# Patient Record
Sex: FEMALE | Race: WHITE | NOT HISPANIC OR LATINO | Employment: FULL TIME | URBAN - METROPOLITAN AREA
[De-identification: names, ages, dates, MRNs, and addresses within clinical notes are randomized per-mention and may not be internally consistent; named-entity substitution may affect disease eponyms.]

---

## 2020-09-16 ENCOUNTER — OFFICE VISIT (OUTPATIENT)
Dept: OPTOMETRY | Facility: CLINIC | Age: 35
End: 2020-09-16
Payer: COMMERCIAL

## 2020-09-16 DIAGNOSIS — H18.823 CONTACT LENS OVERWEAR OF BOTH EYES: ICD-10-CM

## 2020-09-16 DIAGNOSIS — H04.123 DRY EYES: ICD-10-CM

## 2020-09-16 DIAGNOSIS — H16.201 KERATOCONJUNCTIVITIS OF RIGHT EYE: Primary | ICD-10-CM

## 2020-09-16 PROCEDURE — 99203 OFFICE O/P NEW LOW 30 MIN: CPT | Performed by: OPTOMETRIST

## 2020-09-16 RX ORDER — NICOTINE POLACRILEX 2 MG
GUM BUCCAL
COMMUNITY
End: 2022-11-09

## 2020-09-16 RX ORDER — LACTOBACILLUS RHAMNOSUS GG 10B CELL
1 CAPSULE ORAL 2 TIMES DAILY
COMMUNITY

## 2020-09-16 RX ORDER — TOBRAMYCIN AND DEXAMETHASONE 3; 1 MG/ML; MG/ML
1 SUSPENSION/ DROPS OPHTHALMIC 4 TIMES DAILY
Qty: 1 BOTTLE | Refills: 0 | Status: SHIPPED | OUTPATIENT
Start: 2020-09-16 | End: 2020-09-23

## 2020-09-16 RX ORDER — LOPERAMIDE HCL 2 MG
2 CAPSULE ORAL
COMMUNITY
Start: 2020-08-13

## 2020-09-16 RX ORDER — CITALOPRAM HYDROBROMIDE 20 MG/1
TABLET ORAL
COMMUNITY
Start: 2020-06-16 | End: 2023-06-26

## 2020-09-16 RX ORDER — BUPROPION HYDROCHLORIDE 150 MG/1
150 TABLET ORAL
COMMUNITY
Start: 2019-11-21

## 2020-09-16 RX ORDER — LORATADINE 10 MG/1
10 TABLET ORAL
COMMUNITY
Start: 2018-11-05

## 2020-09-16 RX ORDER — CALCIUM POLYCARBOPHIL 625 MG
TABLET ORAL
COMMUNITY

## 2020-09-16 RX ORDER — ELECTROLYTES/DEXTROSE
1 SOLUTION, ORAL ORAL
COMMUNITY
Start: 2018-11-05

## 2020-09-16 ASSESSMENT — SLIT LAMP EXAM - LIDS
COMMENTS: NORMAL
COMMENTS: THIN STRIP

## 2020-09-16 ASSESSMENT — EXTERNAL EXAM - LEFT EYE: OS_EXAM: NORMAL

## 2020-09-16 ASSESSMENT — VISUAL ACUITY
OD_CC: 20/20
CORRECTION_TYPE: GLASSES
METHOD: SNELLEN - LINEAR

## 2020-09-16 ASSESSMENT — EXTERNAL EXAM - RIGHT EYE: OD_EXAM: NORMAL

## 2020-09-16 NOTE — PROGRESS NOTES
Chief Complaint   Patient presents with     Eye Problem Right Eye       Do you wear contact lenses? Yes, has not worn since last night.  HPI    Eye Problem Right Eye      In right eye. Onset was sudden. This started 1 day ago. Charactertized as blurred vision. Severity is moderate. Occurring constantly. It is worse throughout the day. Since onset it is stable. Associated symptoms include redness, eye pain, headache, pain with eye movement, and tearing. Treatments tried include warm compresses. Response to treatment was no improvement. Pain was noted as 4/10.   Comments      Looks like there's a raised bump on the sclera right eye temporally.  Was wearing cls all day yesterday,. When she took them out it felt a little better.  Woke up with more pain this morning.              Gabriela Fowler CPO    See Review Of Systems   Wears Finisar for 3-4 months at a time no extended wear       Medical, surgical and family histories reviewed and updated 9/16/2020.         OBJECTIVE: See Ophthalmology exam    ASSESSMENT:    ICD-10-CM    1. Keratoconjunctivitis of right eye  H16.201 tobramycin-dexamethasone (TOBRADEX) 0.3-0.1 % ophthalmic suspension   2. Dry eyes  H04.123    3. Contact lens overwear of both eyes  H18.823       PLAN:  tobradex four times daily right eye for one week  Artificial tears as needed   Refit in more breathable lens (currently in Proclear)    Clara Eaton OD

## 2020-09-16 NOTE — LETTER
9/16/2020         RE: Mallory Rodriguez  [address] [facility] [facility] [address]        Dear Colleague,    [facility] Please see a copy of my visit note below.    Chief Complaint   Patient presents with     Eye Problem Right Eye       Do you wear contact lenses? Yes, has not worn since last night.  HPI    Eye Problem Right Eye      In right eye. Onset was sudden. This started 1 day ago. Charactertized as blurred vision. Severity is moderate. Occurring constantly. It is worse throughout the day. Since onset it is stable. Associated symptoms include redness, eye pain, headache, pain with eye movement, and tearing. Treatments tried include warm compresses. Response to treatment was no improvement. Pain was noted as 4/10.   Comments      Looks like there's a raised bump on the sclera right eye temporally.  Was wearing cls all day yesterday,. When she took them out it felt a little better.  Woke up with more pain this morning.              Gabriela Fowler CPO    See Review Of Systems   Wears proclear for 3-4 months at a time no extended wear       Medical, surgical and family histories reviewed and updated 9/16/2020.         OBJECTIVE: See Ophthalmology exam    ASSESSMENT:    ICD-10-CM    1. Keratoconjunctivitis of right eye  H16.201 tobramycin-dexamethasone (TOBRADEX) 0.3-0.1 % ophthalmic suspension   2. Dry eyes  H04.123    3. Contact lens overwear of both eyes  H18.823       PLAN:  tobradex four times daily right eye for one week  Artificial tears as needed   Refit in more breathable lens (currently in Proclear)    Clara Eaton OD     Again, thank you for allowing me to participate in the care of your patient.        Sincerely,        Clara Eaton, OD

## 2020-10-08 ENCOUNTER — OFFICE VISIT (OUTPATIENT)
Dept: OPTOMETRY | Facility: CLINIC | Age: 35
End: 2020-10-08
Payer: COMMERCIAL

## 2020-10-08 DIAGNOSIS — H52.13 MYOPIA OF BOTH EYES: Primary | ICD-10-CM

## 2020-10-08 PROCEDURE — 92310 CONTACT LENS FITTING OU: CPT | Mod: GA | Performed by: OPTOMETRIST

## 2020-10-08 PROCEDURE — 92015 DETERMINE REFRACTIVE STATE: CPT | Performed by: OPTOMETRIST

## 2020-10-08 PROCEDURE — 92014 COMPRE OPH EXAM EST PT 1/>: CPT | Performed by: OPTOMETRIST

## 2020-10-08 ASSESSMENT — VISUAL ACUITY
OS_CC: 20/20
METHOD: SNELLEN - LINEAR
OS_SC: 20/100
OD_CC+: -1
OD_SC+: -1
OD_SC: 20/100
OS_CC: 20/20
CORRECTION_TYPE: GLASSES
OD_CC: 20/20
OD_CC: 20/20

## 2020-10-08 ASSESSMENT — REFRACTION_MANIFEST
OD_AXIS: 070
OD_CYLINDER: +0.50
OS_AXIS: 122
OD_SPHERE: -2.00
OS_CYLINDER: +0.50
OS_SPHERE: -2.00
METHOD_AUTOREFRACTION: 1
OS_SPHERE: -2.50
OD_SPHERE: -1.75

## 2020-10-08 ASSESSMENT — KERATOMETRY
OS_K2POWER_DIOPTERS: 43.37
OS_AXISANGLE2_DEGREES: 169
OD_AXISANGLE_DEGREES: 69
OD_K1POWER_DIOPTERS: 42.75
OS_AXISANGLE_DEGREES: 79
OD_K2POWER_DIOPTERS: 43.37
OD_AXISANGLE2_DEGREES: 159
OS_K1POWER_DIOPTERS: 42.62
METHOD_AUTO_MANUAL: AUTOMATED

## 2020-10-08 ASSESSMENT — REFRACTION_CURRENTRX
OD_SPHERE: -1.75
OS_BRAND: ALCON AIR OPTIX PLUS HYDRAGLYDE BC 8.6, D 14.2
OS_SPHERE: -2.25
OD_BRAND: COOPER BIOFINITY BC 8.6, D 14.0

## 2020-10-08 ASSESSMENT — TONOMETRY
IOP_METHOD: APPLANATION
OD_IOP_MMHG: 15
OS_IOP_MMHG: 16

## 2020-10-08 ASSESSMENT — SLIT LAMP EXAM - LIDS
COMMENTS: NORMAL
COMMENTS: THIN STRIP

## 2020-10-08 ASSESSMENT — REFRACTION_WEARINGRX
OS_SPHERE: -2.00
OS_AXIS: 110
OD_SPHERE: -2.25
OD_AXIS: 067
OD_CYLINDER: +0.25
SPECS_TYPE: SVL
OS_CYLINDER: +0.50

## 2020-10-08 ASSESSMENT — CONF VISUAL FIELD
OS_NORMAL: 1
OD_NORMAL: 1
METHOD: COUNTING FINGERS

## 2020-10-08 ASSESSMENT — CUP TO DISC RATIO
OD_RATIO: 0.1
OS_RATIO: 0.1

## 2020-10-08 ASSESSMENT — EXTERNAL EXAM - LEFT EYE: OS_EXAM: NORMAL

## 2020-10-08 ASSESSMENT — EXTERNAL EXAM - RIGHT EYE: OD_EXAM: NORMAL

## 2020-10-08 NOTE — PROGRESS NOTES
"Chief Complaint   Patient presents with     Annual Eye Exam     Contact Lens Evaluation     Blur at distance  Has been wearing glasses for a few weeks since eye infection    AP: 2017  Spends a lot of time on the computer, wonders if this is related to her vision changes.     Previous contact lens wearer? Yes: Proclear  Comfort of contact lenses : They're \"fine\", doesn't have anything to compare them to.   Satisfied with current lenses: No        Last Eye Exam: 2017  Dilated Previously: Yes    What are you currently using to see?  glasses and contacts    Distance Vision Acuity: Noticed gradual change in both eyes    Near Vision Acuity: Satisfied with vision while reading and using computer with glasses and cls    Eye Comfort: good  Do you use eye drops? : No  Occupation or Hobbies: Admin work      Gabriela Fowler CPO     Medical, surgical and family histories reviewed and updated 10/8/2020.     Discussed refit and more frequent replacement   OBJECTIVE: See Ophthalmology exam    ASSESSMENT:    ICD-10-CM    1. Myopia of both eyes  H52.13 EYE EXAM (SIMPLE-NONBILLABLE)     REFRACTION     CONTACT LENS FITTING,BILAT w/ signed waiver     liked air optix over biofinity  for both eyes    PLAN:   Dispensed trials w/ prescription     Clara Eaton OD   "

## 2020-10-08 NOTE — PATIENT INSTRUCTIONS
You may use rewetting drops such as blink or refresh contacts over lenses   and artificial tears when lenses are out    There are over the counter drops that work well and may be used up to 4 x daily when lenses are out if your eyes are dry. ( systane , refresh, thera tears) If you need more than 4 drops daily, use a preservative free product which come in individual vials and may be used for 24 hours until finished and discarded.     If you are in not in dailies, consider clear care solution if still having problems and reduce wear time to 10-12 hours

## 2020-10-08 NOTE — LETTER
"    10/8/2020         RE: Vanessa Rodriguez  [address] [address]        Dear Colleague,    [facility] Please see a copy of my visit note below.    Chief Complaint   Patient presents with     Annual Eye Exam     Contact Lens Evaluation     Blur at distance  Has been wearing glasses for a few weeks since eye infection    AP: 2017  Spends a lot of time on the computer, wonders if this is related to her vision changes.     Previous contact lens wearer? Yes: Proclear  Comfort of contact lenses : They're \"fine\", doesn't have anything to compare them to.   Satisfied with current lenses: No        Last Eye Exam: 2017  Dilated Previously: Yes    What are you currently using to see?  glasses and contacts    Distance Vision Acuity: Noticed gradual change in both eyes    Near Vision Acuity: Satisfied with vision while reading and using computer with glasses and cls    Eye Comfort: good  Do you use eye drops? : No  Occupation or Hobbies: Admin work      Gabriela Fowler CPO     Medical, surgical and family histories reviewed and updated 10/8/2020.     Discussed refit and more frequent replacement   OBJECTIVE: See Ophthalmology exam    ASSESSMENT:    ICD-10-CM    1. Myopia of both eyes  H52.13 EYE EXAM (SIMPLE-NONBILLABLE)     REFRACTION     CONTACT LENS FITTING,BILAT w/ signed waiver     liked air optix over biofinity  for both eyes    PLAN:   Dispensed trials w/ prescription     Clara Eaton OD       Again, thank you for allowing me to participate in the care of your patient.        Sincerely,        Clara Eaton, OD    "

## 2022-09-24 ENCOUNTER — HEALTH MAINTENANCE LETTER (OUTPATIENT)
Age: 37
End: 2022-09-24

## 2022-10-26 NOTE — PROGRESS NOTES
[facility] Dermatology Note  Encounter Date: Nov 9, 2022  Office Visit     Dermatology Problem List:  1. Hair loss-androgenetic alopecia  -topical minoxidil 11/2022 recommended   2. Hirsutism  - Labs: testosterone, DHEAS, Prolactin, TSH - pending  3. Hyperhidrosis  - Drysol  ____________________________________________    Assessment & Plan:    # Androgenetic alopecia  --start minoxidil 5% foam or solution once daily. Keep away from face, counseled on risk of irritation and hair growth on face, risk of temporary shedfing. Keep product away from face. Stop if you become pregnant. Handout provided  - Recommended discontinuing biotin and obtaining labs one month after stopping. She is using an IUD.     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 Submitted letter to pool for coverage    # Dermatitis, left nasolabial fold   - Start OTC hydrocortisone-for 1-2 weeks, then stop      Procedures Performed:   None    Follow-up: 12 week(s) in-person, or earlier for new or changing lesions    Staff and Scribe:     Scribe Disclosure:  RAKESH PAINTING, am serving as a scribe to document services personally performed by Kemi Tristan MD based on data collection and the provider's statements to me.     Provider Disclosure:   The documentation recorded by the scribe accurately reflects the services I  personally performed and the decisions made by me.    Kemi Tristan MD    Department of Dermatology  [facility]: Phone: [phone], Fax:[phone]  [facility]: Phone: [phone], Fax: [phone]  ____________________________________________    CC: Derm Problem (Vanessa is here today to discuss underarm odor and would like to discuss laser hair removal for underarms, upper lip and possibly bikini line.)    HPI:  Ms. Vanessa Rodriguez is a(n) 37 year old female who presents today as a new patient for a LHR consultation.    Today, patient reports that she is interested in LHR on the upper lip, bikini line, underarms, black hair around areolas. She denies prior cosmetic procedures or medical problems in the underarms. She has had unwanted hair growth on the face which she shaves since high school. She has regular periods. She has an IUD. She has been taking Biotin because she has noticed diffuse hair loss. Her family is from [place] originally. She has a family history of facial hair (grandmother). There is also a family history of hair loss. Additionally, she reports an unpleasant armpit odor. She has been using OTC Dove clinical strength and Degree antiperspirant deoderants. She also tried Lume products without improvement.    Patient is otherwise feeling well, without additional skin concerns.    Labs Reviewed:  N/A    Physical Exam:  Vitals: There were no vitals taken for this visit.  SKIN: Focused examination of face, scalp, and axillae. was performed.  - Hair part width 1.5  - Thinning on crown  - Bitemporal thinning  - 1-2 cm hair growth on the frontal scalp.  - 4 cm hair growth along the part.  - Eyebrows and eyelashes normal.  - Fingernails normal.  - Faint tan macules on cheeks and forehead.  - Faint erythematous patch, left nasolabial fold.  - Terminal hair, bilateral  axillae.  - No other lesions of concern on areas examined.     Medications:  Current Outpatient Medications   Medication     Biotin 1 MG CAPS     buPROPion (WELLBUTRIN XL) 150 MG 24 hr tablet     Calcium Polycarbophil (FIBER) 625 MG tablet     citalopram (CELEXA) 20 MG tablet     lactobacillus rhamnosus, GG, (CULTURELL) capsule     loperamide (IMODIUM) 2 MG capsule     loratadine (CLARITIN) 10 MG tablet     Multiple Vitamins-Minerals (MULTIVITAMIN ADULT) TABS     No current facility-administered medications for this visit.      Past Medical History:   There is no problem list on file for this patient.    No past medical history on file.     CC Referred Self, MD  No address on file on close of this encounter.

## 2022-11-09 ENCOUNTER — OFFICE VISIT (OUTPATIENT)
Dept: DERMATOLOGY | Facility: CLINIC | Age: 37
End: 2022-11-09
Payer: COMMERCIAL

## 2022-11-09 DIAGNOSIS — L68.0 HIRSUTISM: ICD-10-CM

## 2022-11-09 DIAGNOSIS — L30.9 DERMATITIS: ICD-10-CM

## 2022-11-09 DIAGNOSIS — L74.510 HYPERHIDROSIS OF AXILLA: ICD-10-CM

## 2022-11-09 DIAGNOSIS — L65.9 LOSS OF HAIR: ICD-10-CM

## 2022-11-09 DIAGNOSIS — L74.510 PRIMARY FOCAL HYPERHIDROSIS, AXILLA: ICD-10-CM

## 2022-11-09 PROCEDURE — 99214 OFFICE O/P EST MOD 30 MIN: CPT | Performed by: DERMATOLOGY

## 2022-11-09 NOTE — PATIENT INSTRUCTIONS
Stop biotin FDA warning , FOR SCALP HAIR LOSS  Biotin FDA warning reviewed    Rogaine for androgenetic alopecia  Consider spironolactone to grow hair on the scalp and reduce hair growth on the face    Botox for armpits  Topical Rogaine (Minoxidil) for Pattern Hair Loss    Minoxidil is an FDA approved over the counter topical for the treatment of hair loss and thinning hair in men and women.   Initially a 2% solution was available however this required application twice daily. A 5% solution is also now approved, only requiring application once per day.     Available Products:   Rogaine 5% solution: Packaged for men however can be used by men or women. Use dropper and apply directly to scalp at bedtime. This product can cause an allergy because of presence of propylene glycol. Stop this product if you develop a rash or itching and contact your physician.   Rogaine 5% foam: Packaged for men and women: Apply foam directly to the scalp once daily. This is less greasy compared to the solution. This formula is preferred for those who had a reaction to the solution product. If you develop rash or itching, stop the product and contact your physician.     What if I stop minoxidil topical?   After stopping minoxidil the hair will return to the usual pattern of thinning. Using the product 3-4 times per week is better than not using product at all.       Can I use generic minoxidil?   Yes, look for 5% minoxidil.     What are the side effects?  The most common side effect is rash or itching of the scalp. This can occur if a contact allergy develops with propylene glycol. A small group of patients noticed the appearance of facial hair if the product runs onto the face or with prolonged use. Keep it away from the face.  This can cause temporary shedding. Please, call us if this happens.  This can irritated the scalp. Please, call us if this happens.  Stop this product if you become pregnant or are breastfeeding      Last updated:  11/2/2021       Laser hair removal     [facility]    [facility]    [facility]

## 2022-11-09 NOTE — LETTER
11/9/2022       RE: Vanessa Rodriguez  [address] [address]     Dear Colleague,    Thank you for referring your patient, Vanessa Rodriguez, to the [facility] [place] at [facility]. Please see a copy of my visit note below.    [facility] Dermatology Note  Encounter Date: Nov 9, 2022  Office Visit     Dermatology Problem List:  1. Hair loss-androgenetic alopecia  -topical minoxidil 11/2022 recommended   2. Hirsutism  - Labs: testosterone, DHEAS, Prolactin, TSH - pending  3. Hyperhidrosis  - Drysol  ____________________________________________    Assessment & Plan:    # Androgenetic alopecia  --start minoxidil 5% foam or solution once daily. Keep away from face, counseled on risk of irritation and hair growth on face, risk of temporary shedfing. Keep product away from face. Stop if you become pregnant. Handout provided  - Recommended discontinuing biotin and obtaining labs one month after stopping. She is using an IUD.     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 Submitted letter to pool for coverage    # Dermatitis, left nasolabial fold   - Start OTC hydrocortisone-for 1-2 weeks, then stop      Procedures Performed:   None    Follow-up: 12 week(s) in-person, or earlier for new or  changing lesions    Staff and Scribe:     Scribe Disclosure:  I, RAKESH BROWN, am serving as a scribe to document services personally performed by Kemi Tristan MD based on data collection and the provider's statements to me.     Provider Disclosure:   The documentation recorded by the scribe accurately reflects the services I personally performed and the decisions made by me.    Kemi Tristan MD    Department of Dermatology  [facility]: Phone: [phone], Fax:[phone]  [facility]: Phone: [phone], Fax: [phone]  ____________________________________________    CC: Derm Problem (Vanessa is here today to discuss underarm odor and would like to discuss laser hair removal for underarms, upper lip and possibly bikini line.)    HPI:  Ms. Vanessa Rodriguez is a(n) 37 year old female who presents today as a new patient for a LHR consultation.    Today, patient reports that she is interested in LHR on the upper lip, bikini line, underarms, black hair around areolas. She denies prior cosmetic procedures or medical problems in the underarms. She has had unwanted hair growth on the face which she shaves since high school. She has regular periods. She has an IUD. She has been taking Biotin because she has noticed diffuse hair loss. Her family is from [place] originally. She has a family history of facial hair (grandmother). There is also a family history of hair loss. Additionally, she reports an unpleasant armpit odor. She has been using OTC Dove clinical strength and Degree antiperspirant deoderants. She also tried Lume products without improvement.    Patient is otherwise feeling well, without additional skin concerns.    Labs Reviewed:  N/A    Physical Exam:  Vitals: There were no vitals taken for this visit.  SKIN: Focused examination of face, scalp, and axillae. was  performed.  - Hair part width 1.5  - Thinning on crown  - Bitemporal thinning  - 1-2 cm hair growth on the frontal scalp.  - 4 cm hair growth along the part.  - Eyebrows and eyelashes normal.  - Fingernails normal.  - Faint tan macules on cheeks and forehead.  - Faint erythematous patch, left nasolabial fold.  - Terminal hair, bilateral axillae.  - No other lesions of concern on areas examined.     Medications:  Current Outpatient Medications   Medication     Biotin 1 MG CAPS     buPROPion (WELLBUTRIN XL) 150 MG 24 hr tablet     Calcium Polycarbophil (FIBER) 625 MG tablet     citalopram (CELEXA) 20 MG tablet     lactobacillus rhamnosus, GG, (CULTURELL) capsule     loperamide (IMODIUM) 2 MG capsule     loratadine (CLARITIN) 10 MG tablet     Multiple Vitamins-Minerals (MULTIVITAMIN ADULT) TABS     No current facility-administered medications for this visit.      Past Medical History:   There is no problem list on file for this patient.    No past medical history on file.     CC Referred Self, MD  No address on file on close of this encounter.      Again, thank you for allowing me to participate in the care of your patient.      Sincerely,    Kemi Tristan MD

## 2022-11-09 NOTE — LETTER
Date:November 14, 2022      Patient was self referred, no letter generated. Do not send.        [facility] Health Information

## 2022-11-09 NOTE — NURSING NOTE
Dermatology Rooming Note    Vanessa Rodriguez's goals for this visit include:   Chief Complaint   Patient presents with     Derm Problem     Vanessa is here today to discuss underarm odor and would like to discuss laser hair removal for underarms, upper lip and possibly bikini line.

## 2022-12-19 ENCOUNTER — VIRTUAL VISIT (OUTPATIENT)
Dept: DERMATOLOGY | Facility: CLINIC | Age: 37
End: 2022-12-19
Payer: COMMERCIAL

## 2022-12-19 DIAGNOSIS — L74.510 HYPERHIDROSIS OF AXILLA: ICD-10-CM

## 2022-12-19 DIAGNOSIS — R23.8 SKIN IRRITATION: Primary | ICD-10-CM

## 2022-12-19 DIAGNOSIS — L68.0 HIRSUTISM: ICD-10-CM

## 2022-12-19 PROCEDURE — 99214 OFFICE O/P EST MOD 30 MIN: CPT | Mod: TEL | Performed by: DERMATOLOGY

## 2022-12-19 RX ORDER — CITALOPRAM HYDROBROMIDE 40 MG/1
TABLET ORAL
COMMUNITY
Start: 2022-09-19

## 2022-12-19 RX ORDER — HYDROCORTISONE 25 MG/G
OINTMENT TOPICAL
Qty: 30 G | Refills: 1 | Status: SHIPPED | OUTPATIENT
Start: 2022-12-19

## 2022-12-19 RX ORDER — EFLORNITHINE HYDROCHLORIDE 139 MG/G
CREAM TOPICAL
Qty: 30 G | Refills: 2 | Status: SHIPPED | OUTPATIENT
Start: 2022-12-19

## 2022-12-19 NOTE — PATIENT INSTRUCTIONS
[facility] Dermatology Visit    Thank you for allowing us to participate in your care. Your findings, instructions and follow-up plan are as follows:       If you plan to have laser hair dont do upper lip or see a board certified derm due to possible melasma (can darken with hair removal)  [facility]  [facility]  [facility]  [facility]  [facility] dermatology    When should I call my doctor?  If you are worsening or not improving, please, contact us or seek urgent care as noted below.     Who should I call with questions (adults)?  [facility] (adult and pediatric): [phone]  [facility] (adult): [phone]  For urgent needs outside of business hours call the [facility] at [phone] and ask for the dermatology resident on call  If this is a medical emergency and you are unable to reach an ER, Call [phone]    Who should I call with questions (pediatric)?  [facility]- Pediatric Dermatology  Dr. Peggy Morel, Dr. Shanice Johnson, Dr. Brandi Stern, Mable Pabon, PA  Dr. Celina Bazan, Dr. Natalia Barnhart & Dr. Avinash Caballero  Non Urgent  Nurse Triage Line; [phone]- Rachele and Ina ZUNIGA Care Coordinators   [place] (/Complex ) [phone]    If you need a prescription refill, please contact your pharmacy. Refills are approved or denied by our physicians during normal business hours, Monday through Fridays  Per office policy, refills will not be granted if you have not been seen within the past year (or sooner depending on your child's condition).    Scheduling Information:  Pediatric Appointment Scheduling and Call Center [phone]  Radiology Scheduling- [phone]  Sedation Unit Scheduling- [phone]  [place] Scheduling- General [phone]; Pediatric Dermatology [phone]  Main  Services:  [phone]  Kazakh: [phone]  Bolivian: [phone]  Hmong/Chinese/Macedonian: [phone]  Preadmission Nursing Department Fax Number: [phone] (fax all pre-operative paperwork to this number)    For urgent matters arising during evenings, weekends, or holidays that cannot wait for normal business hours please call [phone] and ask for the dermatology resident on call to be paged.

## 2022-12-19 NOTE — PROGRESS NOTES
[facility] Dermatology Note  Encounter Date: Dec 19, 2022 Patient stated she will send in photos shortly.   Store-and-Forward and Telephone ([phone]). Location of teledermatologist: [facility].  Start time: 2:14pm. End time: 2:34pm.    Dermatology Problem List:  1. Hair loss-androgenetic alopecia  -topical minoxidil 11/2022 recommended   2. Hirsutism  - Labs: testosterone, DHEAS, Prolactin, TSH - pending  3. Hyperhidrosis  - Drysol  4. Photoaging, consider melasma  -iron oxide sunscreen    ____________________________________________     Assessment & Plan:     # Androgenetic alopecia- not using regularly the topical  --continue minoxidil 5% foam or solution nightly (trying to be more regular)  -consider devyn     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, pt okay with work up  -labs are pending   -declines vaniqa  - Continue Nioxin shampoo      # Hyperhidrosis, axillae- pt feels due to sweat, no odor today, consider bromhidrosis- she did try at home OTC at night clinical strength- has not been doing daily at night. Has script for drysol  -drysol can be picked up  -hydrocortisone sent in  - Future:  botox- sent to Martita Maciel RN     # Dermatitis, left nasolabial fold   - resolved      #Hair removal, upper lip, under arms, sai-areola and legs and bikini line-RECOMMEND TEST AREA ON LIP, concern melasma (could trigger with laser)  -wants quote  -8 sessions, typically done by the RN  -Reviewed the risks of laser use including dyspigmentation, scarring, blistering and need to avoid tan prior. Reviewed this would be considered cosmetic. Numbing cream if patient would like. Irritate skin, rarely cause cardiac issue. Legs no numbing. Just upper lip, armpits and the bikini region.         Procedures Performed:    None    Follow-up: for hair removal and medical regions    Staff and Scribe:     Scribe Disclosure:   Earnest PAINTING, am serving as a scribe to document services  personally performed by this physician based on data collection and the provider's statements to me.       Provider Disclosure:   The documentation recorded by the scribe accurately reflects the services I personally performed and the decisions made by me.    Kemi Tristan MD    Department of Dermatology  [facility]: Phone: [phone], Fax:[phone]  [facility]: Phone: [phone], Fax: [phone]    ____________________________________________    CC: telephone visit  (Laser hair removal )    HPI:  Ms. Vanessa Rodriguez is a(n) 37 year old female who presents today as a return patient for hair removal      Last seen 11/9/22 for numerous issues. At that time, patient was instructed to start minoxidil foam/solution daily for hair loss,start Drysol 20% solution nightly for hyperhidrosis, and start hydrocortisone for 1-2 weeks for dermatitis on the left nasolabial fold.     Today, wants hair removal of upper lip and axillae    Has hard upper dark lip hair and plucks. She does shave. Wants laser hair    Has not heard about botox    Did not start drysol yet    Is not using minoxidil regularly    Patient is otherwise feeling well, without additional skin concerns.    Labs Reviewed:  NA    Physical Exam:  Vitals: There were no vitals taken for this visit.  SKIN: Teledermatology photos were reviewed; image quality and interpretability: acceptable. Image date: today   - unfocused images, axillae with terminal hair, normal areola  -upper lip not focused   - No other lesions of concern on areas examined.     Medications:  Current Outpatient Medications   Medication     aluminum chloride (DRYSOL) 20 % external solution     buPROPion (WELLBUTRIN XL) 150 MG 24 hr tablet     Calcium Polycarbophil (FIBER) 625 MG tablet     citalopram (CELEXA) 40 MG tablet     lactobacillus rhamnosus, GG, (CULTURELL)  capsule     loperamide (IMODIUM) 2 MG capsule     loratadine (CLARITIN) 10 MG tablet     Multiple Vitamins-Minerals (MULTIVITAMIN ADULT) TABS     citalopram (CELEXA) 20 MG tablet     Current Facility-Administered Medications   Medication     botulinum toxin type A (BOTOX) 100 units injection 100 Units      Past Medical/Surgical History:   Patient Active Problem List   Diagnosis     ERNESTINE (generalized anxiety disorder)     Hearing loss of left ear     IBS (irritable bowel syndrome)     Lactose intolerance     Thyroiditis, acute     No past medical history on file.    CC No referring provider defined for this encounter. on close of this encounter.

## 2022-12-19 NOTE — LETTER
12/19/2022         RE: Vanessa Rodriguez  [address] [address]        Dear Colleague,    Thank you for referring your patient, Vanessa Rodriguez, to the [facility]. Please see a copy of my visit note below.    [facility] Dermatology Note  Encounter Date: Dec 19, 2022 Patient stated she will send in photos shortly.   Store-and-Forward and Telephone ([phone]). Location of teledermatologist: [facility].  Start time: 2:14pm. End time: 2:34pm.    Dermatology Problem List:  1. Hair loss-androgenetic alopecia  -topical minoxidil 11/2022 recommended   2. Hirsutism  - Labs: testosterone, DHEAS, Prolactin, TSH - pending  3. Hyperhidrosis  - Drysol  4. Photoaging, consider melasma  -iron oxide sunscreen    ____________________________________________     Assessment & Plan:     # Androgenetic alopecia- not using regularly the topical  --continue minoxidil 5% foam or solution nightly (trying to be more regular)  -consider devyn     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, pt okay with work up  -labs are pending   -declines vaniqa  - Continue Nioxin shampoo      # Hyperhidrosis, axillae- pt feels due to sweat, no odor today, consider bromhidrosis- she did try at home OTC at night clinical strength- has not been doing daily at night. Has script for drysol  -drysol can be picked up  -hydrocortisone sent in  - Future:  botox- sent to Martita Maciel RN     # Dermatitis, left nasolabial fold   - resolved      #Hair removal, upper lip, under arms, sai-areola and legs and bikini line-RECOMMEND TEST AREA ON LIP, concern melasma (could trigger with laser)  -wants quote  -8 sessions, typically done by the RN  -Reviewed the risks of laser use including dyspigmentation, scarring, blistering and need to avoid tan prior. Reviewed this would be considered cosmetic. Numbing cream if patient would like. Irritate skin, rarely cause cardiac  issue. Legs no numbing. Just upper lip, armpits and the bikini region.         Procedures Performed:    None    Follow-up: for hair removal and medical regions    Staff and Scribe:     Scribe Disclosure:   I, Earnest Boo, am serving as a scribe to document services personally performed by this physician based on data collection and the provider's statements to me.       Provider Disclosure:   The documentation recorded by the scribe accurately reflects the services I personally performed and the decisions made by me.    Kemi Tristan MD    Department of Dermatology  [facility]: Phone: [phone], Fax:[phone]  [facility]: Phone: [phone], Fax: [phone]    ____________________________________________    CC: telephone visit  (Laser hair removal )    HPI:  Ms. Vanessa Rodriguez is a(n) 37 year old female who presents today as a return patient for hair removal      Last seen 11/9/22 for numerous issues. At that time, patient was instructed to start minoxidil foam/solution daily for hair loss,start Drysol 20% solution nightly for hyperhidrosis, and start hydrocortisone for 1-2 weeks for dermatitis on the left nasolabial fold.     Today, wants hair removal of upper lip and axillae    Has hard upper dark lip hair and plucks. She does shave. Wants laser hair    Has not heard about botox    Did not start drysol yet    Is not using minoxidil regularly    Patient is otherwise feeling well, without additional skin concerns.    Labs Reviewed:  NA    Physical Exam:  Vitals: There were no vitals taken for this visit.  SKIN: Teledermatology photos were reviewed; image quality and interpretability: acceptable. Image date: today   - unfocused images, axillae with terminal hair, normal areola  -upper lip not focused   - No other lesions of concern on areas examined.     Medications:  Current  Outpatient Medications   Medication     aluminum chloride (DRYSOL) 20 % external solution     buPROPion (WELLBUTRIN XL) 150 MG 24 hr tablet     Calcium Polycarbophil (FIBER) 625 MG tablet     citalopram (CELEXA) 40 MG tablet     lactobacillus rhamnosus, GG, (CULTURELL) capsule     loperamide (IMODIUM) 2 MG capsule     loratadine (CLARITIN) 10 MG tablet     Multiple Vitamins-Minerals (MULTIVITAMIN ADULT) TABS     citalopram (CELEXA) 20 MG tablet     Current Facility-Administered Medications   Medication     botulinum toxin type A (BOTOX) 100 units injection 100 Units      Past Medical/Surgical History:   Patient Active Problem List   Diagnosis     ERNESTINE (generalized anxiety disorder)     Hearing loss of left ear     IBS (irritable bowel syndrome)     Lactose intolerance     Thyroiditis, acute     No past medical history on file.    CC No referring provider defined for this encounter. on close of this encounter.      Again, thank you for allowing me to participate in the care of your patient.        Sincerely,        Kemi Tristan MD

## 2022-12-22 ENCOUNTER — TELEPHONE (OUTPATIENT)
Dept: DERMATOLOGY | Facility: CLINIC | Age: 37
End: 2022-12-22

## 2022-12-22 NOTE — TELEPHONE ENCOUNTER
Writer called pt to discuss cosmetic laser hair reduction pricing quotes, no answer. Left message for her to either call the clinic back or respond to [employer] message.  Sent [employer] message to pt with prices listed.      Bikini Line: $1280 for 8 treatments and $160 for single treatment   Areola: $400 for 8 treatments and $50 for single treatment   Upper lip: $800 for 8 treatments or $100 for single treatment   Underarms: $1200 for 8 treatments or $150 for single treatment   Lower legs: $2720 for 8 treatments or $340 for single treatment     Jaclyn Nava RN on 12/22/2022 at 3:09 PM

## 2023-06-04 ENCOUNTER — HEALTH MAINTENANCE LETTER (OUTPATIENT)
Age: 38
End: 2023-06-04

## 2023-06-26 ENCOUNTER — VIRTUAL VISIT (OUTPATIENT)
Dept: DERMATOLOGY | Facility: CLINIC | Age: 38
End: 2023-06-26
Payer: COMMERCIAL

## 2023-06-26 DIAGNOSIS — Z51.81 ENCOUNTER FOR MEDICATION MONITORING: Primary | ICD-10-CM

## 2023-06-26 DIAGNOSIS — L68.0 HIRSUTISM: ICD-10-CM

## 2023-06-26 PROCEDURE — 99214 OFFICE O/P EST MOD 30 MIN: CPT | Mod: 93 | Performed by: DERMATOLOGY

## 2023-06-26 NOTE — LETTER
6/26/2023         RE: Vanessa Rodriguez  [address] [address]        Dear Colleague,    Thank you for referring your patient, Vanessa Rodriguez, to the [facility]. Please see a copy of my visit note below.    [facility] Dermatology Note  Encounter Date: Jun 26, 2023  Store-and-Forward and Telephone ([phone] ). Location of teledermatologist: [facility].  Start time: 9:45am. End time: 10:01am.    Dermatology Problem List:  1. Hair loss-androgenetic alopecia  -topical minoxidil 11/2022 recommended   2. Hirsutism  - Labs: testosterone, DHEAS, Prolactin, TSH - pending  3. Hyperhidrosis  - Drysol  4. Photoaging, consider melasma  -iron oxide sunscreen     ____________________________________________     Assessment & Plan:     # Androgenetic alopecia- small hair growth on topical, wants to add devyn  --continue minoxidil 5% foam or solution nightly   -  Side effects of spironolactone discussed including postural hypotension, increased frequency of urination, breast tenderness, menstrual spotting, cardiac arrythmias and the need for contraception due to fetal feminization.  The patient will be using IUD for contraception. Reviewed off label. use  Baseline blood pressure, HCG, potassium, and BUN/Cr obtained. Discussed with patient that medication will need to be stopped if pregnancy is desired.   Start Spironolactone 50mg daily which may be increased to twice daily after one week as tolerated.  If patient is over 45 years old or has medical problems we will follow labs        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, pt okay with work up, pending. 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  drysol was irritating. On IUD, will submit to for botox  -clinical strength deodarant  -declines  robinul  -botox message sent to [employer] for prior auth         #Hirsuitism air removal, upper lip, under arms, sai-areola and legs and bikini line-RECOMMEND TEST AREA ON LIP, concern melasma (could trigger with laser)  - has had laser quote  -devyn added    Procedures Performed:    None    Follow-up:  Obtain labs  And if normal can initiate devyn with 2 week lab recheck AND blood pressure.     Staff:     Kemi Tristan MD    Department of Dermatology  [facility]: Phone: [phone], Fax:[phone]  [facility]: Phone: [phone], Fax: [phone]      ____________________________________________    CC: RECHECK (Discuss [facility] procedure for more info)    HPI:  Ms. Vanessa Rodriguez is a(n) 37 year old female who presents today as a return patient for for hair removal    Pt wanted to discuss hair loss. Is using Rogaine solution every since December. She is doing it once daily. She feels she has some baby hairs  She also had skin  irritation under arms. She did use the hydrocortisone.     Patient is otherwise feeling well, without additional skin concerns.    Labs Reviewed:  NA    Physical Exam:  Vitals: There were no vitals taken for this visit.  SKIN: Teledermatology photos were reviewed; image quality and interpretability:  acceptable. Image date:  6/23/2023  - thinning right temporal scalp  - No other lesions of concern on areas examined.     Medications:  Current Outpatient Medications   Medication     aluminum chloride (DRYSOL) 20 % external solution     buPROPion (WELLBUTRIN XL) 150 MG 24 hr tablet     Calcium Polycarbophil (FIBER) 625 MG tablet     citalopram (CELEXA) 40 MG tablet     eflornithine HCl (VANIQA) 13.9 % CREA     hydrocortisone 2.5 % ointment     lactobacillus rhamnosus, GG, (CULTURELL) capsule     loperamide (IMODIUM) 2 MG capsule     loratadine (CLARITIN) 10 MG  tablet     Multiple Vitamins-Minerals (MULTIVITAMIN ADULT) TABS     citalopram (CELEXA) 20 MG tablet     Current Facility-Administered Medications   Medication     botulinum toxin type A (BOTOX) 100 units injection 100 Units     botulinum toxin type A (BOTOX) 100 units injection 100 Units      Past Medical/Surgical History:   Patient Active Problem List   Diagnosis     ERNESTINE (generalized anxiety disorder)     Hearing loss of left ear     IBS (irritable bowel syndrome)     Lactose intolerance     Thyroiditis, acute     No past medical history on file.    CC No referring provider defined for this encounter. on close of this encounter.      Again, thank you for allowing me to participate in the care of your patient.        Sincerely,        Kemi Tristan MD

## 2023-06-26 NOTE — PATIENT INSTRUCTIONS
[facility] Dermatology Visit    Thank you for allowing us to participate in your care. Your findings, instructions and follow-up plan are as follows:     Spironolactone: Patient drug information     What is this drug used for?   It is used to get rid of extra fluid.   It is used to raise potassium in the body.   It is used to treat heart failure (weak heart).   It is used to treat high blood pressure.   It is used to treat some people with high aldosterone levels.   It is used to treat some kidney problems.   It may be given to you for other reasons. Talk with the doctor.    What do I need to tell my doctor BEFORE I take this drug?   If you have an allergy to spironolactone or any other part of this drug.   If you are allergic to this drug; any part of this drug; or any other drugs, foods, or substances. Tell your doctor about the allergy and what signs you had.   If you have any of these health problems: Pompeys Pillar's disease or high potassium levels.   If you are taking any of these drugs: Amiloride, eplerenone, or triamterene.  This is not a list of all drugs or health problems that interact with this drug.  Tell your doctor and pharmacist about all of your drugs (prescription or OTC, natural products, vitamins) and health problems. You must check to make sure that it is safe for you to take this drug with all of your drugs and health problems. Do not start, stop, or change the dose of any drug without checking with your doctor.    What are some things I need to know or do while I take this drug?   Tell all of your health care providers that you take this drug. This includes your doctors, nurses, pharmacists, and dentists.   Avoid driving and doing other tasks or actions that call for you to be alert until you see how this drug affects you.   Check your blood pressure as you have been told.   Have blood work checked as you have been told by the doctor. Talk with the doctor.   This drug may affect  certain lab tests. Tell all of your health care providers and lab workers that you take this drug.   If you are on a low-salt or salt-free diet, talk with your doctor.   Sometimes, this drug may raise potassium levels in the blood. This can be deadly if it is not treated. The risk is highest in people with diabetes, kidney disease, severe illness, and in older adults. Your doctor will follow you closely to change the dose if needed.   If you are taking a salt substitute that has potassium in it, a potassium-sparing diuretic, or a potassium product, talk with your doctor.   Talk with your doctor before you drink alcohol or use other drugs and natural products that slow your actions.   If you have high blood sugar (diabetes), you will need to watch your blood sugar closely.   Tell your doctor if you are pregnant or plan on getting pregnant. You will need to talk about the benefits and risks of using this drug while you are pregnant.   Tell your doctor if you are breast-feeding. You will need to talk about any risks to your baby.    What are some side effects that I need to call my doctor about right away?  WARNING/CAUTION: Even though it may be rare, some people may have very bad and sometimes deadly side effects when taking a drug. Tell your doctor or get medical help right away if you have any of the following signs or symptoms that may be related to a very bad side effect:   Signs of an allergic reaction, like rash; hives; itching; red, swollen, blistered, or peeling skin with or without fever; wheezing; tightness in the chest or throat; trouble breathing, swallowing, or talking; unusual hoarseness; or swelling of the mouth, face, lips, tongue, or throat.   Signs of fluid and electrolyte problems like mood changes, confusion, muscle pain or weakness, a heartbeat that does not feel normal, very bad dizziness or passing out, fast heartbeat, more thirst, seizures, feeling very tired or weak, not hungry, unable to pass  urine or change in the amount of urine produced, dry mouth, dry eyes, or very bad upset stomach or throwing up.   Signs of kidney problems like unable to pass urine, change in how much urine is passed, blood in the urine, or a big weight gain.   Signs of a very bad skin reaction (Wagner-Angelito syndrome/toxic epidermal necrolysis) like red, swollen, blistered, or peeling skin (with or without fever); red or irritated eyes; or sores in the mouth, throat, nose, or eyes.   Very bad dizziness or passing out.   Feeling confused.   Change in balance.   Lowered interest in sex.   Not able to get or keep an erection.   Fever or chills.   Sore throat.   Any unexplained bruising or bleeding.   Black, tarry, or bloody stools.   Throwing up blood or throw up that looks like coffee grounds.   Period (menstrual) changes.   Breast pain.   For males, enlarged breasts.   Liver problems have rarely happened with this drug. Sometimes, this has been deadly. Call your doctor right away if you have signs of liver problems like dark urine, feeling tired, not hungry, upset stomach or stomach pain, light-colored stools, throwing up, or yellow skin or eyes.  What are some other side effects of this drug?  All drugs may cause side effects. However, many people have no side effects or only have minor side effects. Call your doctor or get medical help if any of these side effects or any other side effects bother you or do not go away:   Diarrhea.   Feeling sleepy.   Dizziness.   Headache.   Upset stomach or throwing up.   Stomach cramps.   Hair loss.    These are not all of the side effects that may occur. If you have questions about side effects, call your doctor. Call your doctor for medical advice about side effects.  You may report side effects to your national health agency.  How is this drug best taken?  Use this drug as ordered by your doctor. Read all information given to you. Follow all instructions closely.  All products:   Take with  or without food but take the same way each time. Always take with food or always take on an empty stomach.   Keep taking this drug as you have been told by your doctor or other health care provider, even if you feel well.   This drug may cause you to pass urine more often. To keep from having sleep problems, try not to take too close to bedtime.  Liquid (suspension):   Shake well before use.   Measure liquid doses carefully. Use the measuring device that comes with this drug. If there is none, ask the pharmacist for a device to measure this drug.    What do I do if I miss a dose?   Take a missed dose as soon as you think about it.   If it is close to the time for your next dose, skip the missed dose and go back to your normal time.   Do not take 2 doses at the same time or extra doses.  How do I store and/or throw out this drug?   Store at room temperature.   Store in a dry place. Do not store in a bathroom.   Keep all drugs in a safe place. Keep all drugs out of the reach of children and pets.   Throw away unused or  drugs. Do not flush down a toilet or pour down a drain unless you are told to do so. Check with your pharmacist if you have questions about the best way to throw out drugs. There may be drug take-back programs in your area.  General drug facts   If your symptoms or health problems do not get better or if they become worse, call your doctor.   Do not share your drugs with others and do not take anyone else's drugs.   Some drugs may have another patient information leaflet. If you have any questions about this drug, please talk with your doctor, nurse, pharmacist, or other health care provider.   If you think there has been an overdose, call your poison control center or get medical care right away. Be ready to tell or show what was taken, how much, and when it happened.      Use of UpToDate is subject to the Subscription and License Agreement.  Topic 52207 Version 157.0  Close  The use of  UpToDate is subject to theSubscription and License Agreement.  Lex[employer]omp drug information & [employer] are subject to the [employer] License Agreement.     When should I call my doctor?  If you are worsening or not improving, please, contact us or seek urgent care as noted below.     Who should I call with questions (adults)?  [facility] (adult and pediatric): [phone]  [facility] (adult): [phone]  For urgent needs outside of business hours call the [facility] at [phone] and ask for the dermatology resident on call  If this is a medical emergency and you are unable to reach an ER, Call 911    Who should I call with questions (pediatric)?  [facility]- Pediatric Dermatology  Dr. Peggy Morel, Dr. Shanice Johnson, Dr. Brandi Stern, Mable Pabon, [place]  Dr. Celina Bazan, Dr. Natalia Barnhart & Dr. Avinash Caballero  Non Urgent  Nurse Triage Line; [phone]- Rachele and Ina ZUNIGA Care Coordinators   Elaine (/Complex ) [phone]    If you need a prescription refill, please contact your pharmacy. Refills are approved or denied by our physicians during normal business hours, Monday through Fridays  Per office policy, refills will not be granted if you have not been seen within the past year (or sooner depending on your child's condition).    Scheduling Information:  Pediatric Appointment Scheduling and Call Center [phone]  Radiology Scheduling- [phone]  Sedation Unit Scheduling- [phone]  [place] Scheduling- General [phone]; Pediatric Dermatology [phone]  Main  Services: [phone]  Romansh: [phone]  Surinamese: [phone]  Hmong/Sami/Polish: [phone]  Preadmission Nursing Department Fax Number: [phone] (fax all pre-operative paperwork to this number)    For urgent matters arising during evenings, weekends, or holidays that cannot  wait for normal business hours please call [phone] and ask for the dermatology resident on call to be paged.

## 2023-06-26 NOTE — NURSING NOTE
Chief Complaint   Patient presents with     RECHECK     Discuss LHR procedure for more info     Teledermatology Nurse Call Patients:     Are you in the [facility] at the time of the encounter? yes    Today's visit will be billed to you and your insurance.    A teledermatology visit is not as thorough as an in-person visit and the quality of the photograph sent may not be of the same quality as that taken by the dermatology clinic.    Shelby Kocher

## 2023-06-26 NOTE — PROGRESS NOTES
[facility] Dermatology Note  Encounter Date: Jun 26, 2023  [employer]-and[employer] and Telephone ([phone] ). Location of teledermatologist: [facility].  Start time: 9:45am. End time: 10:01am.    Dermatology Problem List:  1. Hair loss-androgenetic alopecia  -topical minoxidil 11/2022 recommended   2. Hirsutism  - Labs: testosterone, DHEAS, Prolactin, TSH - pending  3. Hyperhidrosis  - Drysol  4. Photoaging, consider melasma  -iron oxide sunscreen     ____________________________________________     Assessment & Plan:     # Androgenetic alopecia- small hair growth on topical, wants to add devyn  --continue minoxidil 5% foam or solution nightly   -  Side effects of spironolactone discussed including postural hypotension, increased frequency of urination, breast tenderness, menstrual spotting, cardiac arrythmias and the need for contraception due to fetal feminization.  The patient will be using IUD for contraception. Reviewed off label. use  Baseline blood pressure, HCG, potassium, and BUN/Cr obtained. Discussed with patient that medication will need to be stopped if pregnancy is desired.   Start Spironolactone 50mg daily which may be increased to twice daily after one week as tolerated.  If patient is over 45 years old or has medical problems we will follow labs        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, pt okay with work up, pending. 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  drysol was irritating. On IUD, will submit to for botox  -clinical strength deodarant  -declines robinul  -botox message sent to [employer] for prior auth         #Hirsuitism air removal, upper lip, under arms, sai-areola and legs and bikini line-RECOMMEND TEST AREA ON LIP, concern melasma (could trigger with laser)  - has had laser quote  -devyn added    Procedures Performed:     None    Follow-up:  Obtain labs  And if normal can initiate devyn with 2 week lab recheck AND blood pressure.     Staff:     Kemi Tristan MD    Department of Dermatology  [facility]: Phone: [phone], Fax:[phone]  [facility]: Phone: [phone], Fax: [phone]      ____________________________________________    CC: RECHECK (Discuss LHR procedure for more info)    HPI:  Ms. Vanessa Rodriguez is a(n) 37 year old female who presents today as a return patient for for hair removal    Pt wanted to discuss hair loss. Is using Rogaine solution every since December. She is doing it once daily. She feels she has some baby hairs  She also had skin  irritation under arms. She did use the hydrocortisone.     Patient is otherwise feeling well, without additional skin concerns.    Labs Reviewed:  NA    Physical Exam:  Vitals: There were no vitals taken for this visit.  SKIN: Teledermatology photos were reviewed; image quality and interpretability:  acceptable. Image date:  6/23/2023  - thinning right temporal scalp  - No other lesions of concern on areas examined.     Medications:  Current Outpatient Medications   Medication     aluminum chloride (DRYSOL) 20 % external solution     buPROPion (WELLBUTRIN XL) 150 MG 24 hr tablet     Calcium Polycarbophil (FIBER) 625 MG tablet     citalopram (CELEXA) 40 MG tablet     eflornithine HCl (VANIQA) 13.9 % CREA     hydrocortisone 2.5 % ointment     lactobacillus rhamnosus, GG, (CULTURELL) capsule     loperamide (IMODIUM) 2 MG capsule     loratadine (CLARITIN) 10 MG tablet     Multiple Vitamins-Minerals (MULTIVITAMIN ADULT) TABS     citalopram (CELEXA) 20 MG tablet     Current Facility-Administered Medications   Medication     botulinum toxin type A (BOTOX) 100 units injection 100 Units     botulinum toxin type A (BOTOX) 100 units injection 100  Units      Past Medical/Surgical History:   Patient Active Problem List   Diagnosis     ERNESTINE (generalized anxiety disorder)     Hearing loss of left ear     IBS (irritable bowel syndrome)     Lactose intolerance     Thyroiditis, acute     No past medical history on file.    CC No referring provider defined for this encounter. on close of this encounter.

## 2023-06-29 ENCOUNTER — TELEPHONE (OUTPATIENT)
Dept: DERMATOLOGY | Facility: CLINIC | Age: 38
End: 2023-06-29
Payer: COMMERCIAL

## 2023-06-29 NOTE — TELEPHONE ENCOUNTER
Pt called and informed that she needs lab work done prior to ordering spironolactone. Pt will get labs done and then let us know when this is completed so we can send to provider to order prescription.    Jaclyn Nava RN on 6/29/2023 at 12:08 PM

## 2023-06-29 NOTE — TELEPHONE ENCOUNTER
[facility] Call Center    Phone Message    May a detailed message be left on voicemail: yes     Reason for Call: Other: Pt was expecting a tablet script to be sent to the [facility], [address] [place] [address] [place], [place] Please call to advise on name and status of medication. Okay to leave a voicemail. Thank you.      Action Taken: Message routed to:  Other: [facility]    Travel Screening: Not Applicable

## 2023-07-14 ENCOUNTER — LAB (OUTPATIENT)
Dept: LAB | Facility: CLINIC | Age: 38
End: 2023-07-14
Payer: COMMERCIAL

## 2023-07-14 DIAGNOSIS — Z51.81 ENCOUNTER FOR MEDICATION MONITORING: ICD-10-CM

## 2023-07-14 LAB
ALBUMIN SERPL BCG-MCNC: 4.6 G/DL (ref 3.5–5.2)
ALP SERPL-CCNC: 60 U/L (ref 35–104)
ALT SERPL W P-5'-P-CCNC: 14 U/L (ref 0–50)
ANION GAP SERPL CALCULATED.3IONS-SCNC: 10 MMOL/L (ref 7–15)
AST SERPL W P-5'-P-CCNC: 22 U/L (ref 0–45)
BILIRUB SERPL-MCNC: 0.5 MG/DL
BUN SERPL-MCNC: 10.3 MG/DL (ref 6–20)
CALCIUM SERPL-MCNC: 9.1 MG/DL (ref 8.6–10)
CHLORIDE SERPL-SCNC: 108 MMOL/L (ref 98–107)
CREAT SERPL-MCNC: 0.74 MG/DL (ref 0.51–0.95)
DEPRECATED HCO3 PLAS-SCNC: 23 MMOL/L (ref 22–29)
GFR SERPL CREATININE-BSD FRML MDRD: >90 ML/MIN/1.73M2
GLUCOSE SERPL-MCNC: 90 MG/DL (ref 70–99)
HCG INTACT+B SERPL-ACNC: <1 MIU/ML
POTASSIUM SERPL-SCNC: 4.1 MMOL/L (ref 3.4–5.3)
PROT SERPL-MCNC: 7.5 G/DL (ref 6.4–8.3)
SODIUM SERPL-SCNC: 141 MMOL/L (ref 136–145)

## 2023-07-14 PROCEDURE — 36415 COLL VENOUS BLD VENIPUNCTURE: CPT

## 2023-07-14 PROCEDURE — 80053 COMPREHEN METABOLIC PANEL: CPT

## 2023-07-14 PROCEDURE — 84702 CHORIONIC GONADOTROPIN TEST: CPT

## 2023-07-17 RX ORDER — SPIRONOLACTONE 50 MG/1
50 TABLET, FILM COATED ORAL DAILY
Qty: 180 TABLET | Refills: 1 | Status: SHIPPED | OUTPATIENT
Start: 2023-07-17 | End: 2023-11-27

## 2023-07-31 ENCOUNTER — TELEPHONE (OUTPATIENT)
Dept: DERMATOLOGY | Facility: CLINIC | Age: 38
End: 2023-07-31
Payer: COMMERCIAL

## 2023-07-31 NOTE — TELEPHONE ENCOUNTER
Anahi More RN  7/31/2023 11:35 AM CDT Back to Top      Spoke to patient regarding her devyn prescription. She just picked it up yesterday. Reminded patient that Dr. Tristan wants her labs rechecked. Pt reports she will have them drawn in the next week or two since she hasn't started taking the spironolactone yet.    Paty Redman RN  7/18/2023  9:13 AM CDT       Will hold to ensure patient reviews result.  EFRAIN Broderick MD  7/17/2023  5:52 PM CDT       I sent devyn     Have patient recheck labs in 2 weeks, labs are in.       Component Ref Range & Units 2 wk ago     Sodium 136 - 145 mmol/L 141    Potassium 3.4 - 5.3 mmol/L 4.1    Chloride 98 - 107 mmol/L 108 High     Carbon Dioxide (CO2) 22 - 29 mmol/L 23    Anion Gap 7 - 15 mmol/L 10    Urea Nitrogen 6.0 - 20.0 mg/dL 10.3    Creatinine 0.51 - 0.95 mg/dL 0.74    Calcium 8.6 - 10.0 mg/dL 9.1    Glucose 70 - 99 mg/dL 90    Alkaline Phosphatase 35 - 104 U/L 60    AST 0 - 45 U/L 22   Comment: Reference intervals for this test were updated on 6/12/2023 to more accurately reflect our healthy population. There may be differences in the flagging of prior results with similar values performed with this method. Interpretation of those prior results can be made in the context of the updated reference intervals.    ALT 0 - 50 U/L 14   Comment: Reference intervals for this test were updated on 6/12/2023 to more accurately reflect our healthy population. There may be differences in the flagging of prior results with similar values performed with this method. Interpretation of those prior results can be made in the context of the updated reference intervals.      Protein Total 6.4 - 8.3 g/dL 7.5    Albumin 3.5 - 5.2 g/dL 4.6    Bilirubin Total <=1.2 mg/dL 0.5    GFR Estimate >60 mL/min/1.73m2 >90   Resulting Agency  [facility]              Specimen Collected: 07/14/23  2:59 PM Last Resulted: 07/14/23 11:16 PM

## 2023-10-17 ENCOUNTER — TELEPHONE (OUTPATIENT)
Dept: DERMATOLOGY | Facility: CLINIC | Age: 38
End: 2023-10-17
Payer: COMMERCIAL

## 2023-10-17 NOTE — TELEPHONE ENCOUNTER
Called and left a voicemail that I went and scheduled a new appointment for 11/20 at 11:15 with  as this is the first available. Told the patient that if this appointment doesn't work out to please call us back at [phone].      Monica TIAN

## 2023-10-17 NOTE — TELEPHONE ENCOUNTER
[facility] Call Center    Phone Message    May a detailed message be left on voicemail: yes     Reason for Call: Appointment Intake    Referring Provider Name: NA  Diagnosis and/or Symptoms: Hisutism and hair loss. Pt is scheduled for both in one Appt and pt would like to reschedule the 10/23 Appt. Please call pt to reschedule. Per protocols we are unable to schedule different specialty in one Appt.  Thanks     Action Taken: Other: [facility]    Travel Screening: Not Applicable

## 2023-10-20 ENCOUNTER — TELEPHONE (OUTPATIENT)
Dept: DERMATOLOGY | Facility: CLINIC | Age: 38
End: 2023-10-20
Payer: COMMERCIAL

## 2023-11-24 ENCOUNTER — TELEPHONE (OUTPATIENT)
Dept: DERMATOLOGY | Facility: CLINIC | Age: 38
End: 2023-11-24
Payer: COMMERCIAL

## 2023-11-27 ENCOUNTER — MYC MEDICAL ADVICE (OUTPATIENT)
Dept: DERMATOLOGY | Facility: CLINIC | Age: 38
End: 2023-11-27

## 2023-11-27 ENCOUNTER — VIRTUAL VISIT (OUTPATIENT)
Dept: DERMATOLOGY | Facility: CLINIC | Age: 38
End: 2023-11-27
Payer: COMMERCIAL

## 2023-11-27 DIAGNOSIS — L68.0 HIRSUTISM: Primary | ICD-10-CM

## 2023-11-27 PROCEDURE — 99214 OFFICE O/P EST MOD 30 MIN: CPT | Mod: 93 | Performed by: DERMATOLOGY

## 2023-11-27 RX ORDER — SPIRONOLACTONE 50 MG/1
50 TABLET, FILM COATED ORAL DAILY
Qty: 180 TABLET | Refills: 1 | Status: SHIPPED | OUTPATIENT
Start: 2023-11-27 | End: 2024-03-19

## 2023-11-27 ASSESSMENT — PAIN SCALES - GENERAL: PAINLEVEL: NO PAIN (0)

## 2023-11-27 NOTE — PATIENT INSTRUCTIONS
[facility] Dermatology Visit    Thank you for allowing us to participate in your care. Your findings, instructions and follow-up plan are as follows:         LOCATION:  [facility]  [address] [place] [address], [address]    Phone:[phone]     parking is about $7   3rd floor for dermatology    This location includes Lab services.                  When should I call my doctor?  If you are worsening or not improving, please, contact us or seek urgent care as noted below.     Who should I call with questions (adults)?  [facility] (adult and pediatric): [phone]  [facility] (adult): [phone]  For urgent needs outside of business hours call the [facility] at [phone] and ask for the dermatology resident on call  If this is a medical emergency and you are unable to reach an ER, Call [phone]    Who should I call with questions (pediatric)?  [facility]- Pediatric Dermatology  Dr. Peggy Morel, Dr. Shanice Johnson, Dr. Brandi Stern, Mable Sungnhclovis, PA  Dr. Celina Bazan, Dr. Natalia Barnhart & Dr. Avinash Caballero  Non Urgent  Nurse Triage Line; [phone]- Rachele and Ina ZUNIGA Care Coordinators   Elaine (/Complex ) [phone]    If you need a prescription refill, please contact your pharmacy. Refills are approved or denied by our physicians during normal business hours, Monday through Fridays  Per office policy, refills will not be granted if you have not been seen within the past year (or sooner depending on your child's condition).    Scheduling Information:  Pediatric Appointment Scheduling and Call Center [phone]  Radiology Scheduling- [phone]  Sedation Unit Scheduling- [phone]  [place] Scheduling- General [phone]; Pediatric Dermatology [phone]  Main  Services:  [phone]  Divehi: [phone]  Libyan: [phone]  Hmong/Yakut/Albanian: [phone]  Preadmission Nursing Department Fax Number: [phone] (fax all pre-operative paperwork to this number)    For urgent matters arising during evenings, weekends, or holidays that cannot wait for normal business hours please call [phone] and ask for the dermatology resident on call to be paged.

## 2023-11-27 NOTE — LETTER
"    11/27/2023         RE: Vanessa Rodriguez  [address] [facility] [address]        Dear Colleague,    Thank you for referring your patient, Vanessa Rodriguez, to the [facility]. Please see a copy of my visit note below.    [facility] Dermatology Note  Encounter Date: Nov 27, 2023  Store-and-Forward and Telephone ([phone]). Location of teledermatologist: [facility].  Start time: 1:24pm. End time: 1:31pm.    Dermatology Problem List:  1. Hair loss-androgenetic alopecia  -topical minoxidil 11/2022 recommended   2. Hirsutism  - Labs: testosterone, DHEAS, Prolactin, TSH - pending  3. Hyperhidrosis  - Drysol  4. Photoaging, consider melasma  -iron oxide sunscreen    Pregnancy protection, pt has IUD, trvels for work     ____________________________________________     Assessment & Plan:     # Androgenetic alopecia- small hair growth on topical, wants to add devyn  --continue minoxidil 5% foam or solution nightly   - she will go get labs  -spironolactone     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, pt okay with work up, pending. 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,   consider bromhidrosis-failed topicals, declines orals, wants botox  - drysol Irritating and stopped  -declines robinul  -botox message sent to [facility] pool for prior auth, second attempts         #\"Hirsutism air removal, upper lip, under arms, sai-areola and legs and bikini line-RECOMMEND TEST AREA ON LIP, concern melasma (could trigger with laser)  - has had laser quote  -devyn added'- no change to this plan       Procedures Performed:    None    Follow-up: 6 month(s) in-person at the [facility] inject botox and check hair, or earlier for new or changing lesions    Staff:     Kemi Tristan MD    Department of Dermatology  [facility]: " Phone: [phone], Fax:[phone]  [facility]: Phone: [phone], Fax: [phone]    ____________________________________________    CC: RECHECK    HPI:  Ms. Vanessa Rodriguez is a(n) 38 year old female who presents today as a return patient for hair loss  Feels doing well    The spironolactone. SHe is not sure if helpfing. She thinks it was helping    Did not get labs    Patient is otherwise feeling well, without additional skin concerns.    Labs Reviewed:   N/A    Physical Exam:  Vitals: There were no vitals taken for this visit.  SKIN: Teledermatology photos were reviewed; image quality and interpretability: acceptable. Image date: today  - normal hair part(not mid line though(  Normal nails  - No other lesions of concern on areas examined.     Medications:  Current Outpatient Medications   Medication     buPROPion (WELLBUTRIN XL) 150 MG 24 hr tablet     Calcium Polycarbophil (FIBER) 625 MG tablet     citalopram (CELEXA) 40 MG tablet     eflornithine HCl (VANIQA) 13.9 % CREA     hydrocortisone 2.5 % ointment     lactobacillus rhamnosus, GG, (CULTURELL) capsule     loperamide (IMODIUM) 2 MG capsule     loratadine (CLARITIN) 10 MG tablet     Multiple Vitamins-Minerals (MULTIVITAMIN ADULT) TABS     spironolactone (ALDACTONE) 50 MG tablet     Current Facility-Administered Medications   Medication     botulinum toxin type A (BOTOX) 100 units injection 100 Units     botulinum toxin type A (BOTOX) 100 units injection 100 Units      Past Medical/Surgical History:   Patient Active Problem List   Diagnosis     ERNESTINE (generalized anxiety disorder)     Hearing loss of left ear     IBS (irritable bowel syndrome)     Lactose intolerance     Thyroiditis, acute     No past medical history on file.    CC No referring provider defined for this encounter. on close of this encounter.      Again, thank you for allowing me to participate in the care of your patient.         Sincerely,        Kemi Tristan MD

## 2023-11-27 NOTE — NURSING NOTE
Teledermatology Nurse Call Patients:     Are you in the [facility] at the time of the encounter? yes    Today's visit will be billed to you and your insurance.    A teledermatology visit is not as thorough as an in-person visit and the quality of the photograph sent may not be of the same quality as that taken by the dermatology clinic.    Chief Complaint   Patient presents with    NAVYA Messina

## 2023-11-27 NOTE — PROGRESS NOTES
"[facility] Dermatology Note  Encounter Date: Nov 27, 2023  Store-and-Forward and Telephone ([phone]). Location of teledermatologist: [facility].  Start time: 1:24pm. End time: 1:31pm.    Dermatology Problem List:  1. Hair loss-androgenetic alopecia  -topical minoxidil 11/2022 recommended   2. Hirsutism  - Labs: testosterone, DHEAS, Prolactin, TSH - pending  3. Hyperhidrosis  - Drysol  4. Photoaging, consider melasma  -iron oxide sunscreen    Pregnancy protection, pt has IUD, trvels for work     ____________________________________________     Assessment & Plan:     # Androgenetic alopecia- small hair growth on topical, wants to add devyn  --continue minoxidil 5% foam or solution nightly   - she will go get labs  -spironolactone     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, pt okay with work up, pending. 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,   consider bromhidrosis-failed topicals, declines orals, wants botox  - drysol Irritating and stopped  -declines robinul  -botox [place] [facility] [place] prior [place], second attempts         #\"Hirsutism air removal, upper lip, under arms, sai-areola and legs and bikini line-RECOMMEND TEST AREA ON LIP, concern melasma (could trigger with laser)  - has had laser quote  -devyn added'- no change to this plan       Procedures Performed:    None    Follow-up: 6 month(s) in[place] [facility] [place] and check hair, or earlier for new or changing lesions    Staff:     Kemi Tristan MD    Department of Dermatology  [facility]: Phone: [phone], Fax:[phone]  [facility]: Phone: [phone], Fax: [phone]    ____________________________________________    CC: NAVYA    HPI:  Ms. Vanessa Rodriguez is a(n) 38 year old female who " presents today as a return patient for hair loss  Feels doing well    The spironolactone. SHe is not sure if helpfing. She thinks it was helping    Did not get labs    Patient is otherwise feeling well, without additional skin concerns.    Labs Reviewed:   N/A    Physical Exam:  Vitals: There were no vitals taken for this visit.  SKIN: Teledermatology photos were reviewed; image quality and interpretability: acceptable. Image date: today  - normal hair part(not mid line though(  Normal nails  - No other lesions of concern on areas examined.     Medications:  Current Outpatient Medications   Medication    buPROPion (WELLBUTRIN XL) 150 MG 24 hr tablet    Calcium Polycarbophil (FIBER) 625 MG tablet    citalopram (CELEXA) 40 MG tablet    eflornithine HCl (VANIQA) 13.9 % CREA    hydrocortisone 2.5 % ointment    lactobacillus rhamnosus, GG, (CULTURELL) capsule    loperamide (IMODIUM) 2 MG capsule    loratadine (CLARITIN) 10 MG tablet    Multiple Vitamins-Minerals (MULTIVITAMIN ADULT) TABS    spironolactone (ALDACTONE) 50 MG tablet     Current Facility-Administered Medications   Medication    botulinum toxin type A (BOTOX) 100 units injection 100 Units    botulinum toxin type A (BOTOX) 100 units injection 100 Units      Past Medical/Surgical History:   Patient Active Problem List   Diagnosis    ERNESTINE (generalized anxiety disorder)    Hearing loss of left ear    IBS (irritable bowel syndrome)    Lactose intolerance    Thyroiditis, acute     No past medical history on file.    CC No referring provider defined for this encounter. on close of this encounter.

## 2023-11-28 ENCOUNTER — TELEPHONE (OUTPATIENT)
Dept: DERMATOLOGY | Facility: CLINIC | Age: 38
End: 2023-11-28
Payer: COMMERCIAL

## 2023-11-28 DIAGNOSIS — L74.510 HYPERHIDROSIS OF AXILLA: Primary | ICD-10-CM

## 2023-11-28 NOTE — TELEPHONE ENCOUNTER
"11/28    Anil Tristan check out notes from visit on 11/27/2023.     \"6 month(s) in[place] [facility] [place] and check hair, or earlier for new or changing lesions\"    Please contact to schedule.    Thank you!     Kaylee raphael Complex   Dermatology, Surgery, Urology  [facility]     "

## 2023-12-20 ENCOUNTER — APPOINTMENT (OUTPATIENT)
Dept: LAB | Facility: CLINIC | Age: 38
End: 2023-12-20
Payer: COMMERCIAL

## 2023-12-20 LAB
BUN SERPL-MCNC: 9.7 MG/DL (ref 6–20)
CREAT SERPL-MCNC: 0.74 MG/DL (ref 0.51–0.95)
EGFRCR SERPLBLD CKD-EPI 2021: >90 ML/MIN/1.73M2

## 2023-12-20 PROCEDURE — 36415 COLL VENOUS BLD VENIPUNCTURE: CPT | Performed by: DERMATOLOGY

## 2023-12-20 PROCEDURE — 82565 ASSAY OF CREATININE: CPT | Performed by: DERMATOLOGY

## 2023-12-20 PROCEDURE — 84520 ASSAY OF UREA NITROGEN: CPT | Performed by: DERMATOLOGY

## 2023-12-26 NOTE — TELEPHONE ENCOUNTER
I spoke with Vanessa and she would like a 6 month hair follow up and botox for hyperhidrosis done in [place]. Unable to do Wednesday appointments at the [place] location . Please do a PA for Hyperhidrosis for the [facility]

## 2023-12-27 NOTE — TELEPHONE ENCOUNTER
CAM order placed for botox for axillary hyperhidrosis.  Will monitor chart for outcome and then call patient.  I tentatively scheduled her for 6/27/24 at 12:30 for numbing and 1:00 with Dr. Tristan.    Paty Redman RN

## 2024-02-27 NOTE — TELEPHONE ENCOUNTER
PB DOS: TBD  Type of Procedure: Botox  CPT Codes:   ICD10 Codes: L74.510 (ICD-10-CM) - Hyperhidrosis of axilla  Surgeon/Ordering provider: Kun  Pre-cert/Authorization completed: no PA required  Payer: [employer]  Spoke to [employer] PA list      Please advise the patient to contact their insurance for coverage and benefits. Prior authorization is not a guarantee of payment. Payment is based on the patient's benefit plan documents at the time of service

## 2024-02-28 NOTE — TELEPHONE ENCOUNTER
I left a VM for Vanessa to call the clinic regarding her June appt with Dr. Tristan. Please let her know that her insurance does not require a prior authorization for botox for hyperhidrosis, vanessa she should still call her insurance to verify coverage.     CPT Codes:   ICD10 Codes: L74.510 (ICD-10-CM)

## 2024-03-19 ENCOUNTER — MYC REFILL (OUTPATIENT)
Dept: DERMATOLOGY | Facility: CLINIC | Age: 39
End: 2024-03-19
Payer: COMMERCIAL

## 2024-03-19 DIAGNOSIS — L68.0 HIRSUTISM: ICD-10-CM

## 2024-03-19 RX ORDER — SPIRONOLACTONE 50 MG/1
50 TABLET, FILM COATED ORAL DAILY
Qty: 180 TABLET | Refills: 1 | Status: SHIPPED | OUTPATIENT
Start: 2024-03-19 | End: 2024-06-27

## 2024-03-19 NOTE — TELEPHONE ENCOUNTER
LVD: 11/27/2023  [facility]emi Carbajal [facility]  Dermatology Hirsutism       Refilled per protocol #2.

## 2024-06-26 NOTE — PATIENT INSTRUCTIONS
It is possible you have melasma and laser hair removal could trigger it. Melasma is not curable and it causes dark upper lip skin.       For warts consider  -injections monthly X4 months(candida)  -consider  chemo cream called efudex        Spironolactone: Patient drug information     What is this drug used for?   It is used to get rid of extra fluid.   It is used to raise potassium in the body.   It is used to treat heart failure (weak heart).   It is used to treat high blood pressure.   It is used to treat some people with high aldosterone levels.   It is used to treat some kidney problems.   It may be given to you for other reasons. Talk with the doctor.    What do I need to tell my doctor BEFORE I take this drug?   If you have an allergy to spironolactone or any other part of this drug.   If you are allergic to this drug; any part of this drug; or any other drugs, foods, or substances. Tell your doctor about the allergy and what signs you had.   If you have any of these health problems: Upton's disease or high potassium levels.   If you are taking any of these drugs: Amiloride, eplerenone, or triamterene.  This is not a list of all drugs or health problems that interact with this drug.  Tell your doctor and pharmacist about all of your drugs (prescription or OTC, natural products, vitamins) and health problems. You must check to make sure that it is safe for you to take this drug with all of your drugs and health problems. Do not start, stop, or change the dose of any drug without checking with your doctor.    What are some things I need to know or do while I take this drug?   Tell all of your health care providers that you take this drug. This includes your doctors, nurses, pharmacists, and dentists.   Avoid driving and doing other tasks or actions that call for you to be alert until you see how this drug affects you.   Check your blood pressure as you have been told.   Have blood work checked as you have  been told by the doctor. Talk with the doctor.   This drug may affect certain lab tests. Tell all of your health care providers and lab workers that you take this drug.   If you are on a low-salt or salt-free diet, talk with your doctor.   Sometimes, this drug may raise potassium levels in the blood. This can be deadly if it is not treated. The risk is highest in people with diabetes, kidney disease, severe illness, and in older adults. Your doctor will follow you closely to change the dose if needed.   If you are taking a salt substitute that has potassium in it, a potassium-sparing diuretic, or a potassium product, talk with your doctor.   Talk with your doctor before you drink alcohol or use other drugs and natural products that slow your actions.   If you have high blood sugar (diabetes), you will need to watch your blood sugar closely.   Tell your doctor if you are pregnant or plan on getting pregnant. You will need to talk about the benefits and risks of using this drug while you are pregnant.   Tell your doctor if you are breast-feeding. You will need to talk about any risks to your baby.    What are some side effects that I need to call my doctor about right away?  WARNING/CAUTION: Even though it may be rare, some people may have very bad and sometimes deadly side effects when taking a drug. Tell your doctor or get medical help right away if you have any of the following signs or symptoms that may be related to a very bad side effect:   Signs of an allergic reaction, like rash; hives; itching; red, swollen, blistered, or peeling skin with or without fever; wheezing; tightness in the chest or throat; trouble breathing, swallowing, or talking; unusual hoarseness; or swelling of the mouth, face, lips, tongue, or throat.   Signs of fluid and electrolyte problems like mood changes, confusion, muscle pain or weakness, a heartbeat that does not feel normal, very bad dizziness or passing out, fast heartbeat, more  thirst, seizures, feeling very tired or weak, not hungry, unable to pass urine or change in the amount of urine produced, dry mouth, dry eyes, or very bad upset stomach or throwing up.   Signs of kidney problems like unable to pass urine, change in how much urine is passed, blood in the urine, or a big weight gain.   Signs of a very bad skin reaction (Wagner-Angelito syndrome/toxic epidermal necrolysis) like red, swollen, blistered, or peeling skin (with or without fever); red or irritated eyes; or sores in the mouth, throat, nose, or eyes.   Very bad dizziness or passing out.   Feeling confused.   Change in balance.   Lowered interest in sex.   Not able to get or keep an erection.   Fever or chills.   Sore throat.   Any unexplained bruising or bleeding.   Black, tarry, or bloody stools.   Throwing up blood or throw up that looks like coffee grounds.   Period (menstrual) changes.   Breast pain.   For males, enlarged breasts.   Liver problems have rarely happened with this drug. Sometimes, this has been deadly. Call your doctor right away if you have signs of liver problems like dark urine, feeling tired, not hungry, upset stomach or stomach pain, light-colored stools, throwing up, or yellow skin or eyes.  What are some other side effects of this drug?  All drugs may cause side effects. However, many people have no side effects or only have minor side effects. Call your doctor or get medical help if any of these side effects or any other side effects bother you or do not go away:   Diarrhea.   Feeling sleepy.   Dizziness.   Headache.   Upset stomach or throwing up.   Stomach cramps.   Hair loss.    These are not all of the side effects that may occur. If you have questions about side effects, call your doctor. Call your doctor for medical advice about side effects.  You may report side effects to your national health agency.  How is this drug best taken?  Use this drug as ordered by your doctor. Read all information  given to you. Follow all instructions closely.  All products:   Take with or without food but take the same way each time. Always take with food or always take on an empty stomach.   Keep taking this drug as you have been told by your doctor or other health care provider, even if you feel well.   This drug may cause you to pass urine more often. To keep from having sleep problems, try not to take too close to bedtime.  Liquid (suspension):   Shake well before use.   Measure liquid doses carefully. Use the measuring device that comes with this drug. If there is none, ask the pharmacist for a device to measure this drug.    What do I do if I miss a dose?   Take a missed dose as soon as you think about it.   If it is close to the time for your next dose, skip the missed dose and go back to your normal time.   Do not take 2 doses at the same time or extra doses.  How do I store and/or throw out this drug?   Store at room temperature.   Store in a dry place. Do not store in a bathroom.   Keep all drugs in a safe place. Keep all drugs out of the reach of children and pets.   Throw away unused or  drugs. Do not flush down a toilet or pour down a drain unless you are told to do so. Check with your pharmacist if you have questions about the best way to throw out drugs. There may be drug take-back programs in your area.  General drug facts   If your symptoms or health problems do not get better or if they become worse, call your doctor.   Do not share your drugs with others and do not take anyone else's drugs.   Some drugs may have another patient information leaflet. If you have any questions about this drug, please talk with your doctor, nurse, pharmacist, or other health care provider.   If you think there has been an overdose, call your poison control center or get medical care right away. Be ready to tell or show what was taken, how much, and when it happened.      Use of UpToDate is subject to the Subscription  and License Agreement.  Topic 25383 Version 157.0  Close  The use of UpToDate is subject to theSubscription and License Agreement.  [employer] drug information & [employer] are subject to the [employer] License Agreement.

## 2024-06-26 NOTE — PROGRESS NOTES
"  [facility] Dermatology Note  Encounter Date: Jun 27, 2024  Office Visit     Dermatology Problem List:  1. Hair loss-androgenetic alopecia  -topical minoxidil 11/2022 recommended   2. Hirsutism  - Labs: testosterone, DHEAS, Prolactin, TSH - pending  3. Hyperhidrosis  - Drysol, Botox  4. Photoaging, consider melasma  -iron oxide sunscreen     Pregnancy protection, pt has IUD, trvels for work    ____________________________________________    Assessment & Plan:    # Androgenetic alopecia- added devyn at last visit, on topical minoxdidil  --continue minoxidil 5% foam or solution nightly   - spironolactone may be continued. Refilled today.      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,  re offered lab work up, last prolactin normal  - spironolactone refilled, reviewed K, pregnancy protection need and also creatinine   -has vaniqa script but this appears not available, offered compounded version and she wants to try BID    # Hyperhidrosis, axillae-\" pt feels due to sweat,   consider bromhidrosis-failed topicals, declines orals, wants botox\" but costly  - declines today  - new topical gel called Sofdra may be an option. Not available for ordering yet so can try at follow up  - Has been using OTC products. Working well with no irritation.     # Melasma possible, forehead  -tinted sunscreen daily, has IUD, reviewed risks    #warts, failed cryo and sal acid and cantherone- left heel and right vall of foot, failed HPV vaccine and also 40% sal acid  -After biopsy site has healed, start Efudex twice daily for 12 weeks or until the onset of irritation. Discussed that we would expect irritation form this medications. Recommend the patient wash hands after use or use gloves to apply. Keep medication away from pets. Avoid sun exposure to treated area.     -Apply twice daily for 12 weeks to warts on heels or until Irrirated. Stop if you stop your IUD. Keep away from pets    Procedures Performed:        Follow-up: 1 " year, earlier as needed    Staff and Scribe:     Scribe Disclosure:   I, Akua Dugan, am serving as a scribe to document services personally performed by Kemi Tristan MD based on data collection and the provider's statements to me.     Provider Disclosure:   The documentation recorded by the scribe accurately reflects the services I personally performed and the decisions made by me.    Kemi Tristan MD    Department of Dermatology  [facility]: Phone: [phone], Fax:[phone]  [facility]: Phone: [phone], Fax: [phone]   ____________________________________________    CC: Hair Loss (Pt has been using the rogaine however it is cumbersome; pt reports she is seeing growth and not as much shed ) and Wart (Plantar warts that patient can't seem to get rid of )    HPI:  Ms. Vanessa Rodriguez is a(n) 38 year old female who presents today as a return patient for hair loss and hyperhidrosis.    Today, patient reports she has been using the topical Rogaine but finds it cumbersome. Does believe she is seeing hair growth and not as much shedding. Reports plantar warts she can't seem to get rid of .     Denies dizziness or  on devyn      Patient is otherwise feeling well, without additional skin concerns.    Labs Reviewed:         Component  Ref Range & Units 11 mo ago    Sodium  136 - 145 mmol/L 141    Potassium  3.4 - 5.3 mmol/L 4.1    Chloride  98 - 107 mmol/L 108 High     Carbon Dioxide (CO2)  22 - 29 mmol/L 23    Anion Gap  7 - 15 mmol/L 10    Urea Nitrogen  6.0 - 20.0 mg/dL 10.3    Creatinine  0.51 - 0.95 mg/dL 0.74    Calcium  8.6 - 10.0 mg/dL 9.1    Glucose  70 - 99 mg/dL 90    Alkaline Phosphatase  35 - 104 U/L 60    AST  0 - 45 U/L 22   Comment: Reference intervals for this test were updated on 6/12/2023 to more accurately reflect our healthy population. There may be differences  in the flagging of prior results with similar values performed with this method. Interpretation of those prior results can be made in the context of the updated reference intervals.    ALT  0 - 50 U/L 14   Comment: Reference intervals for this test were updated on 6/12/2023 to more accurately reflect our healthy population. There may be differences in the flagging of prior results with similar values performed with this method. Interpretation of those prior results can be made in the context of the updated reference intervals.      Protein Total  6.4 - 8.3 g/dL 7.5    Albumin  3.5 - 5.2 g/dL 4.6    Bilirubin Total  <=1.2 mg/dL 0.5    GFR Estimate  >60 mL/min/1.73m2 >90          Physical Exam:  Vitals: There were no vitals taken for this visit.  SKIN: Focused examination of scalp, face and feet was performed.  - 1.25 posteriorly  - 1-2 cm growth noted.   - Eyebrows and eyelashes are normal.  - Hyperpigmentation on the forehead.     - No other lesions of concern on areas examined.     Medications:  Current Outpatient Medications   Medication Sig Dispense Refill    buPROPion (WELLBUTRIN XL) 150 MG 24 hr tablet Take 150 mg by mouth      Calcium Polycarbophil (FIBER) 625 MG tablet       citalopram (CELEXA) 40 MG tablet       lactobacillus rhamnosus, GG, (CULTURELL) capsule Take 1 capsule by mouth 2 times daily      loperamide (IMODIUM) 2 MG capsule Take 2 mg by mouth      loratadine (CLARITIN) 10 MG tablet Take 10 mg by mouth      Multiple Vitamins-Minerals (MULTIVITAMIN ADULT) TABS Take 1 tablet by mouth      spironolactone (ALDACTONE) 50 MG tablet Take 1 tablet (50 mg) by mouth daily Increase to twice daily if tolerating 180 tablet 1    eflornithine HCl (VANIQA) 13.9 % CREA Apply a thin layer twice daily to the upper lip (Patient not taking: Reported on 6/27/2024) 30 g 2    hydrocortisone 2.5 % ointment Apply twice daily for 3 days if drysol is irritating (Patient not taking: Reported on 6/27/2024) 30 g 1     Current  Facility-Administered Medications   Medication Dose Route Frequency Provider Last Rate Last Admin    botulinum toxin type A (BOTOX) 100 units injection 100 Units  100 Units Intramuscular Once Kemi Tristan MD        botulinum toxin type A (BOTOX) 100 units injection 100 Units  100 Units Other Once Kemi Tristan MD        botulinum toxin type A (BOTOX) 100 units injection 100 Units  100 Units Other Once Kemi Tristan MD          Past Medical History:   Patient Active Problem List   Diagnosis    ERNESTINE (generalized anxiety disorder)    Hearing loss of left ear    IBS (irritable bowel syndrome)    Lactose intolerance    Thyroiditis, acute     No past medical history on file.     CC No referring provider defined for this encounter. on close of this encounter.

## 2024-06-27 ENCOUNTER — TELEPHONE (OUTPATIENT)
Dept: DERMATOLOGY | Facility: CLINIC | Age: 39
End: 2024-06-27

## 2024-06-27 ENCOUNTER — OFFICE VISIT (OUTPATIENT)
Dept: DERMATOLOGY | Facility: CLINIC | Age: 39
End: 2024-06-27
Payer: COMMERCIAL

## 2024-06-27 VITALS — DIASTOLIC BLOOD PRESSURE: 60 MMHG | SYSTOLIC BLOOD PRESSURE: 100 MMHG

## 2024-06-27 DIAGNOSIS — L65.9 LOSS OF HAIR: Primary | ICD-10-CM

## 2024-06-27 DIAGNOSIS — B07.9 VERRUCA VULGARIS: ICD-10-CM

## 2024-06-27 DIAGNOSIS — L68.0 HIRSUTISM: ICD-10-CM

## 2024-06-27 PROCEDURE — 99214 OFFICE O/P EST MOD 30 MIN: CPT | Performed by: DERMATOLOGY

## 2024-06-27 RX ORDER — SPIRONOLACTONE 50 MG/1
TABLET, FILM COATED ORAL
Qty: 180 TABLET | Refills: 3 | Status: SHIPPED | OUTPATIENT
Start: 2024-06-27

## 2024-06-27 RX ORDER — FLUOROURACIL 50 MG/G
CREAM TOPICAL
Qty: 40 G | Refills: 1 | Status: SHIPPED | OUTPATIENT
Start: 2024-06-27

## 2024-06-27 NOTE — NURSING NOTE
Vanessa Rodriguez's chief complaint for this visit includes:  Chief Complaint   Patient presents with    Hair Loss     Pt has been using the rogaine however it is cumbersome; pt reports she is seeing growth and not as much shed      PCP: No Ref-Primary, Physician    Referring Provider:  Referred Self, MD  No address on file    There were no vitals taken for this visit.  Data Unavailable      No Known Allergies      Do you need any medication refills at today's visit? Yes, devyn

## 2024-06-27 NOTE — LETTER
"6/27/2024      Vanessa Rodriguez  [address] [address]      Dear Colleague,    Thank you for referring your patient, Vanessa Rodriguez, to the [facility]. Please see a copy of my visit note below.      [facility] Dermatology Note  Encounter Date: Jun 27, 2024  Office Visit     Dermatology Problem List:  1. Hair loss-androgenetic alopecia  -topical minoxidil 11/2022 recommended   2. Hirsutism  - Labs: testosterone, DHEAS, Prolactin, TSH - pending  3. Hyperhidrosis  - Drysol, Botox  4. Photoaging, consider melasma  -iron oxide sunscreen     Pregnancy protection, pt has IUD, trvels for work    ____________________________________________    Assessment & Plan:    # Androgenetic alopecia- added devyn at last visit, on topical minoxdidil  --continue minoxidil 5% foam or solution nightly   - spironolactone may be continued. Refilled today.      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,  re offered lab work up, last prolactin normal  - spironolactone refilled, reviewed K, pregnancy protection need and also creatinine   -has vaniqa script but this appears not available, offered compounded version and she wants to try BID    # Hyperhidrosis, axillae-\" pt feels due to sweat,   consider bromhidrosis-failed topicals, declines orals, wants botox\" but costly  - declines today  - new topical gel called Sofdra may be an option. Not available for ordering yet so can try at follow up  - Has been using OTC products. Working well with no irritation.     # Melasma possible, forehead  -tinted sunscreen daily, has IUD, reviewed risks    #warts, failed cryo and sal acid and cantherone- left heel and right vall of foot, failed HPV vaccine and also 40% sal acid  -After biopsy site has healed, start Efudex twice daily for 12 weeks or until the onset of irritation. Discussed that we would expect irritation form this medications. Recommend the patient wash hands after use or " use gloves to apply. Keep medication away from pets. Avoid sun exposure to treated area.     -Apply twice daily for 12 weeks to warts on heels or until Irrirated. Stop if you stop your IUD. Keep away from pets    Procedures Performed:        Follow-up: 1 year, earlier as needed    Staff and Scribe:     Scribe Disclosure:   I, Akua Ritchie, am serving as a scribe to document services personally performed by Kemi Tristan MD based on data collection and the provider's statements to me.     Provider Disclosure:   The documentation recorded by the scribe accurately reflects the services I personally performed and the decisions made by me.    Kemi Tristan MD    Department of Dermatology  [facility]: Phone: [phone], Fax:[phone]  [facility]: Phone: [phone], Fax: [phone]   ____________________________________________    CC: Hair Loss (Pt has been using the rogaine however it is cumbersome; pt reports she is seeing growth and not as much shed ) and Wart (Plantar warts that patient can't seem to get rid of )    HPI:  Ms. Vanessa Rodriguez is a(n) 38 year old female who presents today as a return patient for hair loss and hyperhidrosis.    Today, patient reports she has been using the topical Rogaine but finds it cumbersome. Does believe she is seeing hair growth and not as much shedding. Reports plantar warts she can't seem to get rid of .     Denies dizziness or  on devyn      Patient is otherwise feeling well, without additional skin concerns.    Labs Reviewed:         Component  Ref Range & Units 11 mo ago    Sodium  136 - 145 mmol/L 141    Potassium  3.4 - 5.3 mmol/L 4.1    Chloride  98 - 107 mmol/L 108 High     Carbon Dioxide (CO2)  22 - 29 mmol/L 23    Anion Gap  7 - 15 mmol/L 10    Urea Nitrogen  6.0 - 20.0 mg/dL 10.3    Creatinine  0.51 - 0.95 mg/dL 0.74    Calcium  8.6 -  10.0 mg/dL 9.1    Glucose  70 - 99 mg/dL 90    Alkaline Phosphatase  35 - 104 U/L 60    AST  0 - 45 U/L 22   Comment: Reference intervals for this test were updated on 6/12/2023 to more accurately reflect our healthy population. There may be differences in the flagging of prior results with similar values performed with this method. Interpretation of those prior results can be made in the context of the updated reference intervals.    ALT  0 - 50 U/L 14   Comment: Reference intervals for this test were updated on 6/12/2023 to more accurately reflect our healthy population. There may be differences in the flagging of prior results with similar values performed with this method. Interpretation of those prior results can be made in the context of the updated reference intervals.      Protein Total  6.4 - 8.3 g/dL 7.5    Albumin  3.5 - 5.2 g/dL 4.6    Bilirubin Total  <=1.2 mg/dL 0.5    GFR Estimate  >60 mL/min/1.73m2 >90          Physical Exam:  Vitals: There were no vitals taken for this visit.  SKIN: Focused examination of scalp, face and feet was performed.  - 1.25 posteriorly  - 1-2 cm growth noted.   - Eyebrows and eyelashes are normal.  - Hyperpigmentation on the forehead.     - No other lesions of concern on areas examined.     Medications:  Current Outpatient Medications   Medication Sig Dispense Refill     buPROPion (WELLBUTRIN XL) 150 MG 24 hr tablet Take 150 mg by mouth       Calcium Polycarbophil (FIBER) 625 MG tablet        citalopram (CELEXA) 40 MG tablet        lactobacillus rhamnosus, GG, (CULTURELL) capsule Take 1 capsule by mouth 2 times daily       loperamide (IMODIUM) 2 MG capsule Take 2 mg by mouth       loratadine (CLARITIN) 10 MG tablet Take 10 mg by mouth       Multiple Vitamins-Minerals (MULTIVITAMIN ADULT) TABS Take 1 tablet by mouth       spironolactone (ALDACTONE) 50 MG tablet Take 1 tablet (50 mg) by mouth daily Increase to twice daily if tolerating 180 tablet 1     eflornithine HCl  (VANIQA) 13.9 % CREA Apply a thin layer twice daily to the upper lip (Patient not taking: Reported on 6/27/2024) 30 g 2     hydrocortisone 2.5 % ointment Apply twice daily for 3 days if drysol is irritating (Patient not taking: Reported on 6/27/2024) 30 g 1     Current Facility-Administered Medications   Medication Dose Route Frequency Provider Last Rate Last Admin     botulinum toxin type A (BOTOX) 100 units injection 100 Units  100 Units Intramuscular Once Kemi Tristan MD         botulinum toxin type A (BOTOX) 100 units injection 100 Units  100 Units Other Once Kemi Tristan MD         botulinum toxin type A (BOTOX) 100 units injection 100 Units  100 Units Other Once Kemi Tristan MD          Past Medical History:   Patient Active Problem List   Diagnosis     ERNESTINE (generalized anxiety disorder)     Hearing loss of left ear     IBS (irritable bowel syndrome)     Lactose intolerance     Thyroiditis, acute     No past medical history on file.     CC No referring provider defined for this encounter. on close of this encounter.         Again, thank you for allowing me to participate in the care of your patient.        Sincerely,        Kemi Tristan MD

## 2024-06-27 NOTE — TELEPHONE ENCOUNTER
After patient saw Dr. Tristan, she had a question regarding use of minoxidil. She would like to know if she needs to continue using the minoxidil or if spironolactone could be the only medication she uses now. Per Dr. Tristan, she recommends continued use of minoxidil if Vanessa likes the current regrowth she is seeing. If she discontinues minoxidil she may have some minimal hair recession.     I called Vanessa and left a message requesting a call back to discuss medication use.     Annette Mckee, NAIMAN

## 2024-07-05 NOTE — TELEPHONE ENCOUNTER
Called and LVM for patient to call us back to discuss her questions regarding medications.       Aissatou Colin RN on 7/5/2024 at 12:09 PM

## 2024-07-14 ENCOUNTER — HEALTH MAINTENANCE LETTER (OUTPATIENT)
Age: 39
End: 2024-07-14

## 2024-12-01 ENCOUNTER — HEALTH MAINTENANCE LETTER (OUTPATIENT)
Age: 39
End: 2024-12-01

## 2025-07-19 ENCOUNTER — HEALTH MAINTENANCE LETTER (OUTPATIENT)
Age: 40
End: 2025-07-19